# Patient Record
Sex: MALE | Race: WHITE
[De-identification: names, ages, dates, MRNs, and addresses within clinical notes are randomized per-mention and may not be internally consistent; named-entity substitution may affect disease eponyms.]

---

## 2018-12-06 ENCOUNTER — HOSPITAL ENCOUNTER (EMERGENCY)
Dept: HOSPITAL 35 - ER | Age: 68
Discharge: HOME | End: 2018-12-06
Payer: COMMERCIAL

## 2018-12-06 VITALS — SYSTOLIC BLOOD PRESSURE: 128 MMHG | DIASTOLIC BLOOD PRESSURE: 67 MMHG

## 2018-12-06 VITALS — BODY MASS INDEX: 28.14 KG/M2 | HEIGHT: 71 IN | WEIGHT: 201 LBS

## 2018-12-06 DIAGNOSIS — E11.9: ICD-10-CM

## 2018-12-06 DIAGNOSIS — I48.91: ICD-10-CM

## 2018-12-06 DIAGNOSIS — R31.9: Primary | ICD-10-CM

## 2018-12-06 LAB
ANION GAP SERPL CALC-SCNC: 9 MMOL/L (ref 7–16)
BACTERIA-REFLEX: (no result) /HPF
BASOPHILS NFR BLD AUTO: 1 % (ref 0–2)
BILIRUB UR-MCNC: NEGATIVE MG/DL
BUN SERPL-MCNC: 30 MG/DL (ref 7–18)
CALCIUM SERPL-MCNC: 9.6 MG/DL (ref 8.5–10.1)
CHLORIDE SERPL-SCNC: 105 MMOL/L (ref 98–107)
CO2 SERPL-SCNC: 26 MMOL/L (ref 21–32)
COLOR UR: (no result)
CREAT SERPL-MCNC: 1 MG/DL (ref 0.7–1.3)
EOSINOPHIL NFR BLD: 3.4 % (ref 0–3)
ERYTHROCYTE [DISTWIDTH] IN BLOOD BY AUTOMATED COUNT: 14.1 % (ref 10.5–14.5)
GLUCOSE SERPL-MCNC: 195 MG/DL (ref 74–106)
GRANULOCYTES NFR BLD MANUAL: 64.6 % (ref 36–66)
HCT VFR BLD CALC: 43.9 % (ref 42–52)
HGB BLD-MCNC: 15.2 GM/DL (ref 14–18)
KETONES UR STRIP-MCNC: (no result) MG/DL
LYMPHOCYTES NFR BLD AUTO: 23.6 % (ref 24–44)
MCH RBC QN AUTO: 30.1 PG (ref 26–34)
MCHC RBC AUTO-ENTMCNC: 34.6 G/DL (ref 28–37)
MCV RBC: 87 FL (ref 80–100)
MONOCYTES NFR BLD: 7.4 % (ref 1–8)
NEUTROPHILS # BLD: 4.6 THOU/UL (ref 1.4–8.2)
PLATELET # BLD: 169 THOU/UL (ref 150–400)
POTASSIUM SERPL-SCNC: 3.8 MMOL/L (ref 3.5–5.1)
RBC # BLD AUTO: 5.05 MIL/UL (ref 4.5–6)
RBC # UR STRIP: (no result) /UL
RBC #/AREA URNS HPF: (no result) /HPF (ref 0–2)
SODIUM SERPL-SCNC: 140 MMOL/L (ref 136–145)
SP GR UR STRIP: >= 1.03 (ref 1–1.03)
URINE CLARITY: (no result)
URINE GLUCOSE-RANDOM*: NEGATIVE
URINE LEUKOCYTES-REFLEX: (no result)
URINE NITRITE-REFLEX: POSITIVE
URINE PROTEIN (DIPSTICK): (no result)
URINE WBC-REFLEX: (no result) /HPF (ref 0–5)
UROBILINOGEN UR STRIP-ACNC: 2 E.U./DL (ref 0.2–1)
WBC # BLD AUTO: 7.1 THOU/UL (ref 4–11)

## 2021-05-26 ENCOUNTER — HOSPITAL ENCOUNTER (OUTPATIENT)
Dept: HOSPITAL 35 - LAB | Age: 71
End: 2021-05-26
Payer: COMMERCIAL

## 2021-05-26 DIAGNOSIS — Z20.822: ICD-10-CM

## 2021-05-26 DIAGNOSIS — Z01.812: Primary | ICD-10-CM

## 2021-05-28 ENCOUNTER — HOSPITAL ENCOUNTER (OUTPATIENT)
Dept: HOSPITAL 35 - GI | Age: 71
Discharge: HOME | End: 2021-05-28
Attending: SPECIALIST
Payer: COMMERCIAL

## 2021-05-28 VITALS — BODY MASS INDEX: 25.9 KG/M2 | WEIGHT: 185.01 LBS | HEIGHT: 70.98 IN

## 2021-05-28 DIAGNOSIS — R13.10: ICD-10-CM

## 2021-05-28 DIAGNOSIS — Z12.11: Primary | ICD-10-CM

## 2021-05-28 DIAGNOSIS — I10: ICD-10-CM

## 2021-05-28 DIAGNOSIS — K57.30: ICD-10-CM

## 2021-05-28 DIAGNOSIS — I48.91: ICD-10-CM

## 2021-05-28 DIAGNOSIS — D12.2: ICD-10-CM

## 2021-05-28 DIAGNOSIS — F32.9: ICD-10-CM

## 2021-05-28 DIAGNOSIS — D12.3: ICD-10-CM

## 2021-05-28 DIAGNOSIS — E11.9: ICD-10-CM

## 2021-05-28 DIAGNOSIS — Z98.890: ICD-10-CM

## 2021-05-28 DIAGNOSIS — K20.80: ICD-10-CM

## 2021-05-28 DIAGNOSIS — Z79.4: ICD-10-CM

## 2021-05-28 DIAGNOSIS — Z79.01: ICD-10-CM

## 2021-05-28 DIAGNOSIS — Z86.010: ICD-10-CM

## 2021-05-28 DIAGNOSIS — Z79.899: ICD-10-CM

## 2021-05-28 PROCEDURE — 62900: CPT

## 2021-05-28 PROCEDURE — 62110: CPT

## 2021-05-31 NOTE — P
[facility]
[address]
[place]   [address]                     PROCEDURE REPORT              
_______________________________________________________________________________
 
Name:       FIDEL ALMEIDA             Room #:                     REG [facility] 
PATTY.#:      [id number]                       Account #:      [id number]  
Admission:  05/28/21    Attend Phys:    Isac Crews
Discharge:              Date of Birth:  [birth year]  
                                                          Report #: [id number]
                                                                    [id number] 
_______________________________________________________________________________
THIS REPORT FOR:  
 
cc:  Michael Fajardo MD, Neal A. MD McElhinney, Christian C. MD                                   ~
 
DOC #: [id number]
 
cc:     MD Isac Rosales MD
DATE OF SERVICE: 05/28/2021
 
PROCEDURE PERFORMED:  Colonoscopy with polypectomies.
 
HISTORY OF PRESENT ILLNESS:  The patient is a 71-year-old male with previous 
history of colon adenomatous polyps on colonoscopy in 03/2010.  He is here for 
followup.  He denies any symptoms.  No family history of colon cancer.  He has 
been on Xarelto for AFib, but this has been held since last Saturday.
 
DESCRIPTION OF PROCEDURE:  The risks and benefits of the procedure were 
explained to the patient, those risks including but not limited to bleeding, 
perforation and the risk of sedation.  He understood these risks and gave 
informed consent.  Sedation was given using propofol per anesthesia.  Next, a 
digital rectal exam was initially performed, which was normal.  Next, using a 
standard Olympus colonoscope, the scope was placed in the patient's anus and 
advanced under direct vision to the cecum.  The overall prep was good.  The 
cecum and ileocecal valve were normal in appearance.  In the ascending colon, 2 
polyps were noted.  The largest was 6 mm and removed by snare cautery.  The 
smaller was 4 mm and removed by cold forceps.  In the transverse colon, another 
6 mm sessile polyp was noted, also removed by snare cautery.  The descending 
colon was normal.  Multiple diverticulae were noted in the sigmoid colon.  No 
evidence of inflammation, otherwise normal.  The rectal mucosa was normal.  On 
retroflexion, no abnormalities were noted.  The scope was then withdrawn and the
procedure terminated.  The patient tolerated the procedure well.
 
IMPRESSION:
1.  Three small colonic polyps.
2.  Sigmoid diverticulosis.
3.  Otherwise, normal colonoscopy.
 
RECOMMENDATIONS:
1.  Await biopsy results.
2.  Repeat colonoscopy in 5 years.
 
Thank you for allowing me to participate in his care.
 
 
 
 
[address]   [address]                     PROCEDURE REPORT              
_______________________________________________________________________________
 
Name:       JUAN PABLOFIDEL AMILCAR             Room #:                     REG JANIS PAUL#:      [id number]                       Account #:      [id number]  
Admission:  05/28/21    Attend Phys:    Isac Crews
Discharge:              Date of Birth:  [birth year]  
                                                          Report #: [id number]
                                                                    [id number] 
_______________________________________________________________________________
 
Isac Jamil MD CCM/JENNI
 
D: 05/28/2021 09:13 AM
T: 05/28/2021 09:22 AM
 
 
 
 
 
 
 
 
 
 
 
 
 
 
 
 
 
 
 
 
 
 
 
 
 
 
 
 
 
 
 
 
 
 
 
 
 
 
  <ELECTRONICALLY SIGNED>
   By: Isac Jamil MD   
  05/31/21     1018
D: 05/28/21 0813                           _____________________________________
T: 05/28/21 0822                           Isac Jamil MD     /nt

## 2021-05-31 NOTE — P
[facility]
[address]
[place]   [address]                     PROCEDURE REPORT              
_______________________________________________________________________________
 
Name:       FIDEL ALMEIDA             Room #:                     REG [facility].#:      [id number]                       Account #:      [id number]  
Admission:  05/28/21    Attend Phys:    Isac Crews
Discharge:              Date of Birth:  [birth year]  
                                                          Report #: [id number]
                                                                    [id number] 
_______________________________________________________________________________
THIS REPORT FOR:  
 
cc:  Michael Fajardo MD, Neal A. MD McElhinney, Christian C. MD                                   ~
 
DOC #: [id number]
 
cc:     MD Isac Rosales MD
DATE OF SERVICE: 05/28/2021
 
PROCEDURE PERFORMED:  Upper endoscopy with biopsies and esophageal dilation.
 
HISTORY OF PRESENT ILLNESS:  The patient is a 71-year-old male with a history of
intermittent dysphagia.  He is not currently taking any antacids.  He denies any
significant heartburn symptoms.  No nausea or vomiting.  He has a history of 
colon polyps.  He is scheduled for EGD and colonoscopy today.
 
DESCRIPTION OF PROCEDURE:  The risks and benefits of the procedure were 
explained to the patient, those risks including but not limited to bleeding, 
perforation and the risk of sedation.  He understood these risks and gave 
informed consent.  Sedation was given using propofol per anesthesia.  Next, 
using a standard Olympus upper endoscope, the scope was placed in the patient's 
mouth and advanced under direct vision through the esophagus, stomach and into 
the second portion of the duodenum.  The larynx was normal in appearance.  The 
upper and mid esophagus was normal.  In the distal esophagus, grade B erosive 
esophagitis was noted, a possible short segment of Boyle's was also seen.  
Biopsies were obtained.  Upon entering the stomach, a small hiatal hernia was 
noted.  Overall, the gastric mucosa was normal.  The pylorus was normal and 
patent.  The duodenal bulb, first and second portion were all normal.  The scope
was then brought back up into the patient's stomach and a Savary guidewire was 
inserted through the scope, leaving the guidewire in place as the scope was then
withdrawn.  Next, a 48-[place] Savary dilation of the esophagus was performed 
without difficulty.  The wire and dilator were removed.  The scope was 
reintroduced into the patient's stomach.  There was no evidence of mucosal tear 
after dilation.  The scope was then withdrawn and the procedure terminated.  The
patient tolerated the procedure well.
 
IMPRESSION:
1.  Grade B erosive esophagitis.
2.  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
3.  Small hiatal hernia.
4.  Otherwise, normal upper endoscopy.
 
RECOMMENDATIONS:
 
 
 
[facility]
[address] [place]   [address]                     PROCEDURE REPORT              
_______________________________________________________________________________
 
Name:       FIDEL ALMEIDA             Room #:                     REG [facility].#:      [id number]                       Account #:      [id number]  
Admission:  05/28/21    Attend Phys:    Isac Crews
Discharge:              Date of Birth:  [birth year]  
                                                          Report #: [id number]
                                                                    [id number] 
_______________________________________________________________________________
 
1.  Await biopsy results.
2.  Recommend daily PPI therapy.
3.  Observe the patient post-dilation.
 
Thank you for allowing me to participate in his care.
 
Isac Jamil MD
[facility]
 
D: 05/28/2021 08:41 AM
T: 05/28/2021 09:05 AM
 
 
 
 
 
 
 
 
 
 
 
 
 
 
 
 
 
 
 
 
 
 
 
 
 
 
 
 
 
 
 
 
  <ELECTRONICALLY SIGNED>
   By: Isac Jamil MD   
  05/31/21     1018
D: 05/28/21 0741                           _____________________________________
T: 05/28/21 0805                           Isac Jamil MD     /nt

## 2021-06-01 NOTE — PATH
[facility]
[address]
[place]   [address]                     PATHOLOGY RPT PROCEDURE       
_______________________________________________________________________________
 
Name:       FIDEL BENSON             Room #:                     REG [facility] 
M..#:      [id number]     Account #:      [id number]  
Admission:  05/28/21    Date of Birth:  [birth year]  
Discharge:                                              Report #:    [id number]
                                                        Path Case #: [id number]
_______________________________________________________________________________
 
[facility] Accession Number: [id number]
.                                                                01
Material submitted:                                        .
PART A: esophagus - DISTAL ESOPHAGUS. Modifiers: distal
PART B: colon - ASCENDING COLON POLYP BIOPSY. Modifiers: ascending
PART C: colon - TRANSVERSE COLON POLYP BIOPSY X2. Modifiers: transverse, X2
.                                                                01
Clinical history:                                          .
DTS/EGD AND COLONOSCOPY/SCREENING REFLUX
COLON POLYPS, DIVERTICULOSIS, ESOPHAGITIS, HIATAL HERNIA
.                                                                02
**********************************************************************
Diagnosis:
A. Gastroesophageal mucosa, distal esophagus, rule out Boyle's,
endoscopic biopsy:
- Gastric cardia-type mucosa with moderate to marked acute and chronic
inflammation.
- Negative for intestinal metaplasia or dysplasia.
- Squamous mucosa with mild esophagitis.
.
B. Polyp, ascending colon polyp, endoscopic biopsy:
- Tubular adenoma.
- Negative for high-grade dysplasia.
.
C. Polyp, transverse colon polyp, endoscopic biopsy:
- Tubular adenoma.
- Negative for high-grade dysplasia.
(IUV:pit; 06/01/2021)
QTP  06/01/2021  1734 Local
**********************************************************************
.                                                                02
Electronically signed:                                     .
Autumn Ames MD, Pathologist
NPI- [id number]
.                                                                01
Gross description:                                         .
A.  Received in formalin labeled "Fidel Benson, distal esophagus rule out
Boyle's" are 2 fragments of tan-brown soft tissue measuring in aggregate
0.5 x 0.5 x 0.1 cm. The specimen is submitted entirely in A1.
.
B.  Received in formalin labeled "Fidel Benson, ascending colon polyp BX"
is a fragment of tan-brown soft tissue measuring 0.3 x 0.3 x 0.2 cm. The
specimen is submitted entirely in B1.
.
C. Received in formalin labeled "Fidel Benson, transverse colon polyp BX
x2" are multiple fragments of tan-brown soft tissue measuring in aggregate
1.2 x 0.6 x 0.2 cm. The specimen is submitted entirely in C1. ([facility];
 
 
[address]   [address]                     PATHOLOGY RPT PROCEDURE       
_______________________________________________________________________________
 
Name:       FIDEL BENSON             Room #:                     REG [facility] 
BLAKE.SOLANGE.#:      [id number]     Account #:      [id number]  
Admission:  05/28/21    Date of Birth:  [birth year]  
Discharge:                                              Report #:    [id number]
                                                        Path Case #: [id number]
_______________________________________________________________________________
5/30/2021)
SY/SYC  05/30/2021  1252 Local
.                                                                02
Pathologist provided ICD-10:
K20.80, D12.2, D12.3
.                                                                02
CPT                                                        .
525592, 427840, 176641
Specimen Comment: A courtesy copy of this report has been sent to [phone]
[phone]
Specimen Comment: [id number]
Specimen Comment: Report sent to  / DR FAIRBANKS
***Performed at:  01
   [facility], [place]  [id number]
   MD Goldy Lantigua MD Phone:  [phone]
***Performed at:  02
   [facility]  [id number]
   MD Autumn Ames MD Phone:  [phone]